# Patient Record
Sex: MALE | URBAN - METROPOLITAN AREA
[De-identification: names, ages, dates, MRNs, and addresses within clinical notes are randomized per-mention and may not be internally consistent; named-entity substitution may affect disease eponyms.]

---

## 2019-07-01 ENCOUNTER — APPOINTMENT (OUTPATIENT)
Age: 44
Setting detail: DERMATOLOGY
End: 2019-07-01

## 2019-07-01 DIAGNOSIS — D22 MELANOCYTIC NEVI: ICD-10-CM

## 2019-07-01 DIAGNOSIS — L81.4 OTHER MELANIN HYPERPIGMENTATION: ICD-10-CM

## 2019-07-01 DIAGNOSIS — L82.1 OTHER SEBORRHEIC KERATOSIS: ICD-10-CM

## 2019-07-01 PROBLEM — D22.5 MELANOCYTIC NEVI OF TRUNK: Status: ACTIVE | Noted: 2019-07-01

## 2019-07-01 PROBLEM — D48.5 NEOPLASM OF UNCERTAIN BEHAVIOR OF SKIN: Status: ACTIVE | Noted: 2019-07-01

## 2019-07-01 PROCEDURE — OTHER SUNSCREEN RECOMMENDATIONS: OTHER

## 2019-07-01 PROCEDURE — OTHER PATHOLOGY BILLING: OTHER

## 2019-07-01 PROCEDURE — 99214 OFFICE O/P EST MOD 30 MIN: CPT | Mod: 25

## 2019-07-01 PROCEDURE — 88305 TISSUE EXAM BY PATHOLOGIST: CPT

## 2019-07-01 PROCEDURE — OTHER BIOPSY BY SHAVE METHOD: OTHER

## 2019-07-01 PROCEDURE — 11102 TANGNTL BX SKIN SINGLE LES: CPT

## 2019-07-01 PROCEDURE — OTHER COUNSELING: OTHER

## 2019-07-01 ASSESSMENT — LOCATION SIMPLE DESCRIPTION DERM
LOCATION SIMPLE: LEFT LOWER BACK
LOCATION SIMPLE: RIGHT UPPER BACK

## 2019-07-01 ASSESSMENT — LOCATION DETAILED DESCRIPTION DERM
LOCATION DETAILED: LEFT INFERIOR MEDIAL LOWER BACK
LOCATION DETAILED: RIGHT SUPERIOR UPPER BACK
LOCATION DETAILED: RIGHT SUPERIOR MEDIAL UPPER BACK

## 2019-07-01 ASSESSMENT — LOCATION ZONE DERM: LOCATION ZONE: TRUNK

## 2019-07-01 NOTE — PROCEDURE: PATHOLOGY BILLING
Immunohistochemistry (41726 and 12139) billing is not performed here. Please use the Immunohistochemistry Stain Billing plan to accomplish this. Immunohistochemistry (15926 and 11416) billing is not performed here. Please use the Immunohistochemistry Stain Billing plan to accomplish this.

## 2019-07-01 NOTE — PROCEDURE: BIOPSY BY SHAVE METHOD
Notification Instructions: Patient will be notified of biopsy results. However, patient instructed to call the office if not contacted within 2 weeks.
Anesthesia Volume In Cc (Will Not Render If 0): 0.5
Biopsy Type: H and E
Biopsy Method: Dermablade
Anesthesia Type: 1% Xylocaine with epinephrine
Type Of Destruction Used: Curettage
Depth Of Biopsy: dermis
Curettage Text: The wound bed was treated with curettage after the biopsy was performed.
Electrodesiccation Text: The wound bed was treated with electrodesiccation after the biopsy was performed.
Bill 97909 For Specimen Handling/Conveyance To Laboratory?: no
Cryotherapy Text: The wound bed was treated with cryotherapy after the biopsy was performed.
Dressing: Band-Aid
Wound Care: Petrolatum
Silver Nitrate Text: The wound bed was treated with silver nitrate after the biopsy was performed.
Electrodesiccation And Curettage Text: The wound bed was treated with electrodesiccation and curettage after the biopsy was performed.
Post-Care Instructions: I reviewed with the patient in detail post-care instructions. Patient is to keep the biopsy site moist, apply aquaphor or vaseline daily and cover with band-aid until healed.
X Size Of Lesion In Cm: 0
Billing Type: Third-Party Bill
Was A Bandage Applied: Yes
Detail Level: Detailed
Consent: Written consent was obtained and risks were reviewed including but not limited to scarring, infection, bleeding, scabbing, incomplete removal, nerve damage and allergy to anesthesia.
Hemostasis: Drysol

## 2020-09-01 ENCOUNTER — APPOINTMENT (OUTPATIENT)
Dept: GENERAL RADIOLOGY | Facility: CLINIC | Age: 45
End: 2020-09-01
Attending: EMERGENCY MEDICINE
Payer: COMMERCIAL

## 2020-09-01 ENCOUNTER — HOSPITAL ENCOUNTER (EMERGENCY)
Facility: CLINIC | Age: 45
Discharge: HOME | End: 2020-09-01
Attending: EMERGENCY MEDICINE | Admitting: EMERGENCY MEDICINE
Payer: COMMERCIAL

## 2020-09-01 VITALS
DIASTOLIC BLOOD PRESSURE: 83 MMHG | WEIGHT: 248.9 LBS | HEART RATE: 66 BPM | SYSTOLIC BLOOD PRESSURE: 132 MMHG | RESPIRATION RATE: 16 BRPM | TEMPERATURE: 98.2 F | OXYGEN SATURATION: 96 %

## 2020-09-01 DIAGNOSIS — R07.9 CHEST PAIN, UNSPECIFIED TYPE: ICD-10-CM

## 2020-09-01 DIAGNOSIS — H10.33 ACUTE CONJUNCTIVITIS OF BOTH EYES, UNSPECIFIED ACUTE CONJUNCTIVITIS TYPE: ICD-10-CM

## 2020-09-01 DIAGNOSIS — R11.0 NAUSEA: ICD-10-CM

## 2020-09-01 LAB
ANION GAP SERPL CALCULATED.3IONS-SCNC: 3 MMOL/L (ref 3–14)
BASOPHILS # BLD AUTO: 0 10E9/L (ref 0–0.2)
BASOPHILS NFR BLD AUTO: 0.7 %
BUN SERPL-MCNC: 14 MG/DL (ref 7–30)
CALCIUM SERPL-MCNC: 9.2 MG/DL (ref 8.5–10.1)
CHLORIDE SERPL-SCNC: 107 MMOL/L (ref 94–109)
CO2 SERPL-SCNC: 30 MMOL/L (ref 20–32)
CREAT SERPL-MCNC: 1.29 MG/DL (ref 0.66–1.25)
DIFFERENTIAL METHOD BLD: NORMAL
EOSINOPHIL # BLD AUTO: 0.1 10E9/L (ref 0–0.7)
EOSINOPHIL NFR BLD AUTO: 1.7 %
ERYTHROCYTE [DISTWIDTH] IN BLOOD BY AUTOMATED COUNT: 13.6 % (ref 10–15)
GFR SERPL CREATININE-BSD FRML MDRD: 67 ML/MIN/{1.73_M2}
GLUCOSE SERPL-MCNC: 97 MG/DL (ref 70–99)
HCT VFR BLD AUTO: 51.3 % (ref 40–53)
HGB BLD-MCNC: 16.6 G/DL (ref 13.3–17.7)
IMM GRANULOCYTES # BLD: 0 10E9/L (ref 0–0.4)
IMM GRANULOCYTES NFR BLD: 0.7 %
INTERPRETATION ECG - MUSE: NORMAL
LYMPHOCYTES # BLD AUTO: 1.3 10E9/L (ref 0.8–5.3)
LYMPHOCYTES NFR BLD AUTO: 21.7 %
MCH RBC QN AUTO: 29.1 PG (ref 26.5–33)
MCHC RBC AUTO-ENTMCNC: 32.4 G/DL (ref 31.5–36.5)
MCV RBC AUTO: 90 FL (ref 78–100)
MONOCYTES # BLD AUTO: 0.6 10E9/L (ref 0–1.3)
MONOCYTES NFR BLD AUTO: 10.8 %
NEUTROPHILS # BLD AUTO: 3.7 10E9/L (ref 1.6–8.3)
NEUTROPHILS NFR BLD AUTO: 64.4 %
NRBC # BLD AUTO: 0 10*3/UL
NRBC BLD AUTO-RTO: 0 /100
PLATELET # BLD AUTO: 203 10E9/L (ref 150–450)
POTASSIUM SERPL-SCNC: 4.1 MMOL/L (ref 3.4–5.3)
RBC # BLD AUTO: 5.71 10E12/L (ref 4.4–5.9)
SODIUM SERPL-SCNC: 140 MMOL/L (ref 133–144)
TROPONIN I SERPL-MCNC: <0.015 UG/L (ref 0–0.04)
WBC # BLD AUTO: 5.8 10E9/L (ref 4–11)

## 2020-09-01 PROCEDURE — 25000132 ZZH RX MED GY IP 250 OP 250 PS 637: Performed by: EMERGENCY MEDICINE

## 2020-09-01 PROCEDURE — 93005 ELECTROCARDIOGRAM TRACING: CPT

## 2020-09-01 PROCEDURE — 99285 EMERGENCY DEPT VISIT HI MDM: CPT | Mod: 25

## 2020-09-01 PROCEDURE — 85025 COMPLETE CBC W/AUTO DIFF WBC: CPT | Performed by: EMERGENCY MEDICINE

## 2020-09-01 PROCEDURE — 80048 BASIC METABOLIC PNL TOTAL CA: CPT | Performed by: EMERGENCY MEDICINE

## 2020-09-01 PROCEDURE — 25000128 H RX IP 250 OP 636: Performed by: EMERGENCY MEDICINE

## 2020-09-01 PROCEDURE — 71045 X-RAY EXAM CHEST 1 VIEW: CPT

## 2020-09-01 PROCEDURE — 84484 ASSAY OF TROPONIN QUANT: CPT | Performed by: EMERGENCY MEDICINE

## 2020-09-01 PROCEDURE — 96374 THER/PROPH/DIAG INJ IV PUSH: CPT

## 2020-09-01 RX ORDER — DICYCLOMINE HCL 20 MG
20 TABLET ORAL 4 TIMES DAILY PRN
Qty: 20 TABLET | Refills: 0 | Status: SHIPPED | OUTPATIENT
Start: 2020-09-01 | End: 2022-08-20

## 2020-09-01 RX ORDER — ASPIRIN 325 MG
325 TABLET ORAL ONCE
Status: COMPLETED | OUTPATIENT
Start: 2020-09-01 | End: 2020-09-01

## 2020-09-01 RX ORDER — DICYCLOMINE HCL 20 MG
20 TABLET ORAL ONCE
Status: COMPLETED | OUTPATIENT
Start: 2020-09-01 | End: 2020-09-01

## 2020-09-01 RX ORDER — ONDANSETRON 2 MG/ML
4 INJECTION INTRAMUSCULAR; INTRAVENOUS ONCE
Status: COMPLETED | OUTPATIENT
Start: 2020-09-01 | End: 2020-09-01

## 2020-09-01 RX ORDER — SULFACETAMIDE SODIUM 100 MG/ML
1-2 SOLUTION/ DROPS OPHTHALMIC EVERY 6 HOURS
Qty: 3 ML | Refills: 0 | Status: SHIPPED | OUTPATIENT
Start: 2020-09-01 | End: 2020-09-08

## 2020-09-01 RX ADMIN — DICYCLOMINE HYDROCHLORIDE 20 MG: 20 TABLET ORAL at 10:39

## 2020-09-01 RX ADMIN — ONDANSETRON 4 MG: 2 INJECTION INTRAMUSCULAR; INTRAVENOUS at 10:22

## 2020-09-01 RX ADMIN — ASPIRIN 325 MG ORAL TABLET 325 MG: 325 PILL ORAL at 10:39

## 2020-09-01 ASSESSMENT — ENCOUNTER SYMPTOMS
EYE DISCHARGE: 1
FEVER: 0
BACK PAIN: 1
DIARRHEA: 1
EYE REDNESS: 1

## 2020-09-01 NOTE — ED AVS SNAPSHOT
[facility] Emergency Department  [address]  [facility] [address]  Phone:  [phone]  Fax:  [phone]                                    Rigo Vanegas   MRN: [id number]    Department:  [facility] Emergency Department   Date of Visit:  9/1/2020           After Visit Summary Signature Page    I have received my discharge instructions, and my questions have been answered. I have discussed any challenges I see with this plan with the nurse or doctor.    ..........................................................................................................................................  Patient/Patient Representative Signature      ..........................................................................................................................................  Patient Representative Print Name and Relationship to Patient    ..................................................               ................................................  Date                                   Time    ..........................................................................................................................................  Reviewed by Signature/Title    ...................................................              ..............................................  Date                                               Time          22EPIC Rev 08/18

## 2020-09-01 NOTE — ED TRIAGE NOTES
"Chest pain and shortness of breath for the past 2 weeks.  Associated with stomach \"churning\"  Also having red and mattery eyes for the past week.   "

## 2020-09-01 NOTE — ED PROVIDER NOTES
"  History     Chief Complaint:  Chest Pain    The history is provided by the patient.      Rigo Vanegas is a 45 year old male who presents with mid back pain and left sided chest pain. He reports that he developed a stabbing pain in his back several weeks ago which radiated to his left chest and arm, which feel dull and achy. He is also experiencing an associated \"churning\" sensation in his stomach and indigestion. 1 week ago, he developed red, mattery eyes and cloudy vision. He was seen in the  and was advised to come to the ED for further evaluation of his chest pain. He denies shortness of breath above his baseline and has no known sick contacts. Of note, his mother has a Cyclospora infection, however, his fecal culture was negative.     Allergies:  No known drug allergies     Medications:    Albuterol  Lexapro  Advair  Norco  Nasonex  Singulair  Hydroxyzine     Past Medical History:    Asthma  Depressive disorder    Past Surgical History:    History reviewed. No pertinent surgical history.    Family History:    History reviewed. No pertinent family history.     Social History:  Smoking status: never  Alcohol use: not currently   Marital Status:  Single [1]     Review of Systems   Constitutional: Negative for fever.   Eyes: Positive for discharge and redness.   Cardiovascular: Positive for chest pain.   Gastrointestinal: Positive for diarrhea.   Musculoskeletal: Positive for back pain.   All other systems reviewed and are negative.    Physical Exam     Patient Vitals for the past 24 hrs:   BP Temp Temp src Pulse Resp SpO2 Weight   09/01/20 1100 132/83 -- -- 66 -- 96 % --   09/01/20 1030 126/80 -- -- 72 -- 97 % --   09/01/20 1008 (!) 117/99 98.2  F (36.8  C) Oral 76 16 100 % 112.9 kg (248 lb 14.4 oz)     Physical Exam  Constitutional: Vital signs reviewed as above.   Head: No external signs of trauma noted.  Eyes: Pupils are equal, round, and reactive to light. B/L conjunctival injection  Neck: No JVD " "noted  Cardiovascular: Normal rate, regular rhythm and normal heart sounds.  No murmur heard. Equal B/L peripheral pulses.  Pulmonary/Chest: Effort normal and breath sounds normal. No respiratory distress. Patient has no wheezes. Patient has no rales.   Gastrointestinal: Soft. There is no tenderness on my exam. No rebound or guarding.   Musculoskeletal/Extremities: No edema noted. Normal tone.  Neurological: Patient is alert and oriented to person, place, and time.   Skin: Skin is warm and dry. There is no diaphoresis noted.   Psychiatric: The patient appears calm.    Emergency Department Course   EKG:  ECG Taken at 10:15     NSR  Normal EKG  Rate: 69. SD: 124. QRS: 92. QTc: 430.  P-R-T Axes:   27   58   50  No old EKG  Interpreted by me at 1019 on 9/1/2020    Imaging:  Radiographic findings were communicated with the patient who voiced understanding of the findings.  XR Chest Port 1 view   IMPRESSION: No acute cardiopulmonary disease  Reading per radiology    Laboratory:  Laboratory findings were communicated with the patient who voiced understanding of the findings.  CBC: WNL. (WBC 5.8, HGB 16.6, )   BMP: Glucose 97, o/w WNL (Creatinine: 1.29 (H))    Troponin (Collected 1023): <0.015    Interventions:  1022 Zofran 4mg IV injection   1039 Bentyl, 20 mg, PO  1039 Aspirin, 325 mg, PO    Emergency Department Course:    ED Course as of Sep 01 1416   Tue Sep 01, 2020   1253 Rechecked and updated via iPad. Abdominal \"churning\" is better. Chest feels fine. Comfortable with DC and PCP F/U.          Impression & Plan      Medical Decision Making:  This 45-year-old male patient presents the ED due to chest pain, abdominal discomfort, and had eye irritation.  Please see the HPI and exam for specifics.  Patient remained well in the ED.  I do not believe that his symptoms are from ACS.  His eyes look like bilateral conjunctivitis and I will treat him as such with antibiotics.  He seemed to have some improvement of his " abdominal symptoms with Bentyl so I will encourage him to follow-up in the outpatient setting with his primary care clinic and discuss further follow-up with gastroenterology due to the duration of his abdominal symptoms but in the meantime we will prescribe Bentyl and Zofran for home and encouraged follow-up as noted.  Anticipatory guidance given prior to discharge.      Diagnosis:    ICD-10-CM    1. Acute conjunctivitis of both eyes, unspecified acute conjunctivitis type  H10.33 CBC with platelets differential     Troponin I     Basic metabolic panel   2. Chest pain, unspecified type  R07.9    3. Nausea  R11.0        Disposition:  discharged to home    Discharge Medications:  Discharge Medication List as of 9/1/2020  1:03 PM      START taking these medications    Details   dicyclomine (BENTYL) 20 MG tablet Take 1 tablet (20 mg) by mouth 4 times daily as needed (abdominal cramping), Disp-20 tablet,R-0, E-Prescribe      sulfacetamide (BLEPH-10) 10 % ophthalmic solution Apply 1-2 drops to eye every 6 hours for 7 days, Disp-3 mL,R-0, E-Prescribe           Scribe Disclosure:  ITheresa, am serving as a scribe at 10:40 AM on 9/1/2020 to document services personally performed by Tariq Cox DO based on my observations and the provider's statements to me.     Theresa Shipley  9/1/2020   [facility] EMERGENCY DEPARTMENT       Tariq Cox DO  09/01/20 8502

## 2020-09-01 NOTE — DISCHARGE INSTRUCTIONS
"Diagnosis: Abdominal \"churning\", chest pain, conjunctivitis  What do you do next:   Continue your home medications unless we have specifically changed them  I have prescribed an antibiotic drop for your eyes called sulfacetamide.  Please take this as directed  I have prescribed \"Bentyl\" for your abdomen.  Please take this as directed (as needed)  It would be reasonable to discuss repeat kidney function testing with your primary clinic as well as discussing gastroenterology referral as well.  Follow up as indicated below    When do you return: If you have worsening abdominal pain; intractable vomiting; severe chest pain associated with lightheadedness, fainting, shortness of breath, cold sweats, or any other symptoms that concern you, please return to the ED for reevaluation.    Thank you for allowing us to care for you today.    "

## 2020-09-03 ENCOUNTER — DOCUMENTATION ONLY (OUTPATIENT)
Dept: OTHER | Facility: CLINIC | Age: 45
End: 2020-09-03

## 2020-11-18 ENCOUNTER — HOSPITAL ENCOUNTER (OUTPATIENT)
Dept: CARDIOLOGY | Facility: CLINIC | Age: 45
End: 2020-11-18
Attending: INTERNAL MEDICINE
Payer: COMMERCIAL

## 2020-11-18 DIAGNOSIS — R06.00 DYSPNEA: ICD-10-CM

## 2020-11-18 PROCEDURE — 93306 TTE W/DOPPLER COMPLETE: CPT | Mod: 26 | Performed by: INTERNAL MEDICINE

## 2020-11-18 PROCEDURE — 255N000002 HC RX 255 OP 636: Performed by: INTERNAL MEDICINE

## 2020-11-18 PROCEDURE — 93010 ELECTROCARDIOGRAM REPORT: CPT | Performed by: INTERNAL MEDICINE

## 2020-11-18 PROCEDURE — 93005 ELECTROCARDIOGRAM TRACING: CPT

## 2020-11-18 RX ADMIN — HUMAN ALBUMIN MICROSPHERES AND PERFLUTREN 3 ML: 10; .22 INJECTION, SOLUTION INTRAVENOUS at 15:31

## 2020-11-19 LAB — INTERPRETATION ECG - MUSE: NORMAL

## 2021-07-10 ENCOUNTER — HEALTH MAINTENANCE LETTER (OUTPATIENT)
Age: 46
End: 2021-07-10

## 2021-09-04 ENCOUNTER — HEALTH MAINTENANCE LETTER (OUTPATIENT)
Age: 46
End: 2021-09-04

## 2022-07-31 ENCOUNTER — HEALTH MAINTENANCE LETTER (OUTPATIENT)
Age: 47
End: 2022-07-31

## 2022-08-20 ENCOUNTER — TELEPHONE (OUTPATIENT)
Dept: BEHAVIORAL HEALTH | Facility: CLINIC | Age: 47
End: 2022-08-20

## 2022-08-20 ENCOUNTER — HOSPITAL ENCOUNTER (EMERGENCY)
Facility: CLINIC | Age: 47
Discharge: HOME | End: 2022-08-20
Attending: EMERGENCY MEDICINE | Admitting: EMERGENCY MEDICINE
Payer: COMMERCIAL

## 2022-08-20 VITALS
HEIGHT: 74 IN | OXYGEN SATURATION: 100 % | BODY MASS INDEX: 28.39 KG/M2 | HEART RATE: 82 BPM | RESPIRATION RATE: 16 BRPM | TEMPERATURE: 98 F | WEIGHT: 221.2 LBS | DIASTOLIC BLOOD PRESSURE: 88 MMHG | SYSTOLIC BLOOD PRESSURE: 143 MMHG

## 2022-08-20 DIAGNOSIS — F42.9 OBSESSIVE-COMPULSIVE DISORDER, UNSPECIFIED TYPE: ICD-10-CM

## 2022-08-20 DIAGNOSIS — R45.851 SUICIDAL IDEATION: ICD-10-CM

## 2022-08-20 DIAGNOSIS — F32.A DEPRESSION, UNSPECIFIED DEPRESSION TYPE: ICD-10-CM

## 2022-08-20 LAB — SARS-COV-2 RNA RESP QL NAA+PROBE: NEGATIVE

## 2022-08-20 PROCEDURE — C9803 HOPD COVID-19 SPEC COLLECT: HCPCS

## 2022-08-20 PROCEDURE — 99285 EMERGENCY DEPT VISIT HI MDM: CPT | Mod: 25

## 2022-08-20 PROCEDURE — 99285 EMERGENCY DEPT VISIT HI MDM: CPT | Mod: 25 | Performed by: NURSE PRACTITIONER

## 2022-08-20 PROCEDURE — U0005 INFEC AGEN DETEC AMPLI PROBE: HCPCS | Performed by: EMERGENCY MEDICINE

## 2022-08-20 PROCEDURE — 90791 PSYCH DIAGNOSTIC EVALUATION: CPT

## 2022-08-20 PROCEDURE — 250N000013 HC RX MED GY IP 250 OP 250 PS 637: Performed by: NURSE PRACTITIONER

## 2022-08-20 RX ORDER — MIRTAZAPINE 45 MG/1
45 TABLET, FILM COATED ORAL AT BEDTIME
Qty: 30 TABLET | Refills: 0 | Status: SHIPPED | OUTPATIENT
Start: 2022-08-20

## 2022-08-20 RX ORDER — HYDROXYZINE HYDROCHLORIDE 25 MG/1
25-50 TABLET, FILM COATED ORAL EVERY 6 HOURS PRN
COMMUNITY

## 2022-08-20 RX ORDER — VENLAFAXINE HYDROCHLORIDE 150 MG/1
CAPSULE, EXTENDED RELEASE ORAL
COMMUNITY
Start: 2022-08-02

## 2022-08-20 RX ORDER — MIRTAZAPINE 30 MG/1
TABLET, FILM COATED ORAL
COMMUNITY
Start: 2022-08-15

## 2022-08-20 RX ORDER — MONTELUKAST SODIUM 10 MG/1
1 TABLET ORAL DAILY
COMMUNITY
Start: 2022-04-08

## 2022-08-20 RX ORDER — FEXOFENADINE HCL 180 MG/1
180 TABLET ORAL DAILY
COMMUNITY

## 2022-08-20 RX ORDER — PROPRANOLOL HYDROCHLORIDE 10 MG/1
10-20 TABLET ORAL 2 TIMES DAILY
Qty: 60 TABLET | Refills: 1 | Status: SHIPPED | OUTPATIENT
Start: 2022-08-20

## 2022-08-20 RX ORDER — DUTASTERIDE 0.5 MG/1
0.5 CAPSULE, LIQUID FILLED ORAL DAILY
COMMUNITY
Start: 2021-12-31

## 2022-08-20 RX ORDER — POLYETHYLENE GLYCOL 3350 17 G
2 POWDER IN PACKET (EA) ORAL
Status: DISCONTINUED | OUTPATIENT
Start: 2022-08-20 | End: 2022-08-20 | Stop reason: HOSPADM

## 2022-08-20 RX ORDER — BUSPIRONE HYDROCHLORIDE 10 MG/1
20 TABLET ORAL
COMMUNITY
Start: 2021-09-17

## 2022-08-20 RX ORDER — BUSPIRONE HYDROCHLORIDE 10 MG/1
20 TABLET ORAL
Status: DISCONTINUED | OUTPATIENT
Start: 2022-08-20 | End: 2022-08-20 | Stop reason: HOSPADM

## 2022-08-20 RX ADMIN — BUSPIRONE HYDROCHLORIDE 20 MG: 10 TABLET ORAL at 17:17

## 2022-08-20 ASSESSMENT — ACTIVITIES OF DAILY LIVING (ADL)
ADLS_ACUITY_SCORE: 35

## 2022-08-20 NOTE — PROGRESS NOTES
Patient agreeable to discharge plan. Discharge instructions reviewed with patient including follow-up care plan. Medications reviewed. Reviewed safety plan and outpatient resources. Denies SI and HI. All belongings that were brought into the hospital have been returned to patient. Escorted off the unit at 1730 accompanied by [facility] staff. Discharged to home via uber.

## 2022-08-20 NOTE — ED NOTES
Patient continues to rest quietly on cart. Bed in lowest position with side rail up x 1. Call light in easy reach. Patient given additional ice chips and ice water. Denies additional needs. Updated on continued POC and waits.     Continue to monitor.

## 2022-08-20 NOTE — ED NOTES
Awake alert and oriented to person, place, time and situation.    Airway is patent.    Respirations are regular and unlabored.  Patient talking in full sentences.  Patient denies cough or shortness of breath.    Pulses are strong and regular with palpation.  Skin is normal color, warm and dry.   Cap refill is less than 3 seconds.  Patient denies chest pain/pressure.    Patient reports long standing history of mental health problems. States he did try to die by suicide when he was a teenager by attempting to hang himself. Patient reports current establishment with therapist. Patient endorses problems with depression and OCD. Patient reports increased problems with suicidal thoughts since effexor increased from 225mg to 300mg 2 weeks ago. Patient denies any specific plan at present. Has had thoughts about suicide by gun or ingestion.     Patient is able to contract for safety with RN.    Patient is given scrubs and instructions to change and place all belongings in a patient belonging bag. Patient verbalized understanding.     Patient changed. Security wanded patient for safety.    Video Observation initiated, patient informed.    Patient given ice water to drink.    Patient given [facility] brochure.     Patient updated on continued pOC and waits. Denies additional needs at present.     Continue to monitor.

## 2022-08-20 NOTE — CONSULTS
"Diagnostic Evaluation Consultation  Crisis Assessment    Patient was assessed: In Person  Patient location: [facility]  Was a release of information signed: Yes. Providers included on the release: [facility] referrals, Mom Ema, Step dad Lalit, [facility]      Referral Data and Chief Complaint  Rigo is a 47 year old, who uses he/him pronouns, and presents to the ED via EMS. Patient is referred to the ED by family/friends. Patient is presenting to the ED for the following concerns: OCD, depression, si/hi.      Informed Consent and Assessment Methods     Patient is his own guardian. Writer met with patient and explained the crisis assessment process, including applicable information disclosures and limits to confidentiality, assessed understanding of the process, and obtained consent to proceed with the assessment. Patient was observed to be able to participate in the assessment as evidenced by verbal understanding of the assessment process. Assessment methods included conducting a formal interview with patient, review of medical records, collaboration with medical staff, and obtaining relevant collateral information from family and community providers when available..     Over the course of this crisis assessment provided reassurance, offered validation, engaged patient in problem solving and disposition planning, worked with patient on safety and aftercare planning and provided psychoeducation. Patient's response to interventions was engaged and cooperative     Summary of Patient Situation  Pt presents to the ED via EMS after he was found locked in the bathroom chanting obsessively while crying \"I need to kill myself, how can I kill myself\".  Pt was unable to calm himself until he came to the ED.    Brief Psychosocial History  Pt currently lives at home with his mother and step father.  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  PT states that his MH sx have been impairing his ability to " "function at work.  Pt reports support from his parents.    Significant Clinical History  Pt reports a hx of anxiety, depression, and OCD.  PT reports that the dx of OCD is newer and feels that he has not rec'd specific OCD treatment historically.  PT reports that he wakes up every morning in a panic with his obsessional thinking.  He reports that his repeative thoughts include repeating to himself for hours \"I am a loser, I am fat, I need to be a better person, I can't live like this\". Pt reports that if he wakes up and doesn't have anxiety, he becomes scared and why it's not there.  PT reports his panic and attacks exacerbates his suicidal ideation.    Pt reports on going obsessional thinking about himself, how he is as a person, and this results in on going compulsions to make himself more comfortable.  PT reports this can be as simple as changing the way he walks, sits, talks in attempts to feel less internally agitated and obsessive.  Pt reports significant guilt and shame.  Pt reports that he feels like he is being judged.    PT reports intrusive increased suicidal thoughts.  PT states that he has been thinking about different plans and if there accessible, wondering if life is worth it.  PT states that he doesn't want to die but is so uncomfortable in his body with his thoughts.  PT has been thinking about plans such as overdosing, hanging , or firearm.  Pt denies any access to firearms.  PT reports a previous attempt via hanging in college.  Pt denies any self harm via cutting, but endorses skin picking behaviors.  Pt additionally reports intrusive homicidal ideation.  Pt reports that it feels random and its thoughts such as \"what If I run him over or what if I punch him\".  Pt denies any intent.  PT denies any previous actions to hurt others and feels shameful about these thoughts.  Pt presents as flat.    Pt endorses significant depression including hours long crying spells with panic, hopelessness and " worthlessness.  Pt reports feeling internally irritable and ambivalent about life.  Pt reports that he used to be able to idenfy things that he enjoyed doing and he can no longer do as such or finds karla in such.  Pt denies any psychosis, jean claude, delusional thinking or paranoia.    PT reports previous MH hospitalization at [facility].  Pt reports an active therapist and psychiatrist at [facility].  PT reports medication compliance.  PT reports that his medications con't to be increased without relief in fact possible decompensation.    Pt denies any drug use or medical concerns.     Collateral Information   The following information was received from Ema/Lalit whose relationship to the patient is mother. Information was obtained via phone. Their phone number is  and they last had contact with patient on today.    What happened today: They report that they found him in the bathroom this am talking about wanting to kill self how he would find a way to kill himself.  They called EMS for a MH eval    What is different about patient's functioning: They report that he is not doing well.  He wakes up crying, sobbing, and holding his head.  He will state over and over for hours that he doesn't want to be a loser .  They report that his panic and crying spells can go on for so long he can get nose bleeds and he doesn't eat.  She reports that he is med and provider compliant without success of relief of sx.  They report previous MH hospitalizations.    Concern about alcohol/drug use: No    What do you think the patient needs: hospitalization    Has patient made comments about wanting to kill themselves/others:  Yes He has been making statements of suicide and wanting to die.  They report a previous suicide attempt in college via hanging himself.     If d/c is recommended, can they take part in safety/aftercare planning: Yes .    Other information:      Risk Assessment  ESS-6  1.a. Over the past 2 weeks, have you had  thoughts of killing yourself? Yes  1.b. Have you ever attempted to kill yourself and, if yes, when did this last happen? Yes college, hanging   2. Recent or current suicide plan? Yes OD, guns, hanging   3. Recent or current intent to act on ideation? No  4. Lifetime psychiatric hospitalization? Yes  5. Pattern of excessive substance use? No  6. Current irritability, agitation, or aggression? Yes  Scoring note: BOTH 1a and 1b must be yes for it to score 1 point, if both are not yes it is zero. All others are 1 point per number. If all questions 1a/1b - 6 are no, risk is negligible. If one of 1a/1b is yes, then risk is mild. If either question 2 or 3, but not both, is yes, then risk is automatically moderate regardless of total score. If both 2 and 3 are yes, risk is automatically high regardless of total score.      Score: 4, high risk      Does the patient have access to lethal means? No     Does the patient engage in non-suicidal self-injurious behavior (NSSI/SIB)? no     Does the patient have thoughts of harming others? Yes.  Does the patient have a specific victim in mind? No Do they have a plan? No Do they have intent? No Is this a duty to warn situation?  no     Is the patient engaging in sexually inappropriate behavior?  no        Current Substance Abuse     Is there recent substance abuse? no     Was a urine drug screen or blood alcohol level obtained: No       Mental Status Exam     Affect: Blunted and Flat   Appearance: Appropriate    Attention Span/Concentration: Attentive  Eye Contact: Variable   Fund of Knowledge: Appropriate    Language /Speech Content: Fluent   Language /Speech Volume: Soft    Language /Speech Rate/Productions: Articulate    Recent Memory: Intact   Remote Memory: Intact   Mood: Anxious and Depressed    Orientation to Person: Yes    Orientation to Place: Yes   Orientation to Time of Day: Yes    Orientation to Date: Yes    Situation (Do they understand why they are here?): Yes     Psychomotor Behavior: Normal    Thought Content: Suicidal   Thought Form: Intact      History of commitment:  No           Medication    Psychotropic medications: Yes. Pt is currently taking Effexor, Remeron, Visteril, Buspar. Medication compliant: Yes. Recent medication changes: No  Medication changes made in the last two weeks: No       Current Care Team    Primary Care Provider: No  Psychiatrist: [facility]  Therapist: [facility]  : No     [facility]SS or [facility]: No  [facility] Team: No  Other: No      Diagnosis    300.3 (F42) Obsessive Compulsive Disorder   300.02 (F41.1) Generalized Anxiety Disorder - primary   296.33 (F33.2) Major Depressive Disorder, Recurrent Episode, Severe _ and With anxious distress - primary       Clinical Summary and Substantiation of Recommendations    Writer at time of assessment recommends inpatient stabilization for Pt's significant level of OCD/anxiety sx comorbid with depression that has led to increased intrusive suicidal ideation with planning.  PT additionally is reporting intrusive homicidal ideation.  PT is in need of further evaluation and stabilization.  PT is voluntary.  Pt is likely in need of significant follow up care such as [facility] behavioral [facility].    Disposition    Recommended disposition: Inpatient Mental Health       Reviewed case and recommendations with attending provider. Attending Name: Victoria Martell       Attending concurs with disposition: Yes       Patient concurs with disposition: Yes       Guardian concurs with disposition: NA      Final disposition: Inpatient mental health .     Inpatient Details (if applicable):  Is patient admitted voluntarily:Yes      Patient aware of potential for transfer if there is not appropriate placement? Yes       Patient is willing to travel outside of the [facility] for placement? No      Behavioral Intake Notified? Yes: Date: 8/20 Time: 1200.     Assessment Details    Patient interview started at: 1200 and completed at: 1300.     Total  duration spent on the patient case in minutes: 1.0 hrs      CPT code(s) utilized: 98712 - Psychotherapy for Crisis - 60 (30-74*) min       PETR Saavedra  [facility] - Triage & Transition Services

## 2022-08-20 NOTE — ED PROVIDER NOTES
"History   Chief Complaint:  \"It's been a really tough year or two\"    HPI   History supplemented by electronic chart review    Rigo Vanegas is a 47 year old male who presents by EMS for evaluation of mental health concerns including some suicidal thoughts with notions of using a gun or overdosing on pills, though he volunteers that he does not have access to a gun and does not think his pills would cause death.  He reports \"lots of guilt, shame, fear, embarrassment\" related to \"not being good enough\" and cites previous diagnosis of OCD for which he is undergoing therapy.  He reports medication compliance without overdose and states that his Lexapro was increased to 300 mg about 3 weeks ago by his outpatient team.  He denies any fevers, cough, new pain, vomiting, or any other medical concerns.  He does not use alcohol or drugs.    Review of Systems  All other systems reviewed and negative except as above in HPI.    Allergies:  No Known Allergies     Medications:    albuterol (PROAIR HFA, PROVENTIL HFA, VENTOLIN HFA) 108 (90 BASE) MCG/ACT inhaler  BREO ELLIPTA 200-25 MCG/INH Inhaler  busPIRone (BUSPAR) 10 MG tablet  dutasteride (AVODART) 0.5 MG capsule  fexofenadine (ALLEGRA) 180 MG tablet  hydrOXYzine (ATARAX) 25 MG tablet  mirtazapine (REMERON) 30 MG tablet  montelukast (SINGULAIR) 10 MG tablet  tiZANidine (ZANAFLEX) 4 MG tablet  venlafaxine (EFFEXOR XR) 150 MG 24 hr capsule        Past Medical History:    Past Medical History:   Diagnosis Date     Asthma      Depressive disorder        There are no problems to display for this patient.       Past Surgical History:    History reviewed. No pertinent surgical history.     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  Lives with parent and parent in law, with whom he reports a good relationship.    Physical Exam     Patient Vitals for the past 24 hrs:   BP Temp Temp src Pulse Resp SpO2 Height Weight   08/20/22 1649 (!) 143/88 98  F " "(36.7  C) Oral 82 16 100 % -- --   08/20/22 1047 133/89 97.7  F (36.5  C) Oral 79 -- 100 % 1.88 m (6' 2\") 100.3 kg (221 lb 3.2 oz)   08/20/22 0843 (!) 128/90 99.6  F (37.6  C) Temporal 89 20 94 % 1.88 m (6' 2\") 102.1 kg (225 lb)      Physical Exam  General: Nontoxic-appearing male sitting upright in room 16  HENT: mucous membranes moist, beard  Eyes: PERRL without nystagmus  CV: extremities well perfused, regular rhythm  Resp: normal effort, speaks in full phrases, no stridor, no cough observed  GI: abdomen soft and nontender, no guarding  MSK: no bony tenderness   Skin: appropriately warm and dry  Neuro: alert, clear speech, oriented, normal tone in extremities, ambulatory  Psych: cooperative, reports feeling suicidal, no evidence of hallucinations    Emergency Department Course   Laboratory:  Labs Ordered and Resulted from Time of ED Arrival to Time of ED Departure   COVID-19 VIRUS (CORONAVIRUS) BY PCR - Normal       Result Value    SARS CoV2 PCR Negative     COMPREHENSIVE METABOLIC PANEL   ACETAMINOPHEN LEVEL   SALICYLATE LEVEL        Emergency Department Course:  Reviewed:  I reviewed nursing notes, vitals, and past medical history    Assessments:  I obtained history and examined the patient as noted above.          Patient placed on a DOMINICK    Consults:   See above in ED Course    Interventions:  Medications   busPIRone (BUSPAR) tablet 20 mg (has no administration in time range)        Disposition:  Transfer to [facility]     Impression & Plan    Medical Decision Making:  He presents with worrisome deterioration of his care is a diagnosed psychiatric illness despite reported medication compliance.  Medical precipitants were considered such as infection, metabolic abnormality, intoxication or withdrawal states, though are not identified or highly suspected.  The rationale for transfer the EmPATH unit for more detailed psychiatric evaluation and treatment was discussed and he was subsequently transferred there once " medically cleared.    Diagnosis:    ICD-10-CM    1. Suicidal ideation  R45.851    2. Obsessive-compulsive disorder, unspecified type  F42.9    3. Depression, unspecified depression type  F32.A           8/20/2022   MD Shanice Davenport, Chuy Ward MD  08/20/22 1708

## 2022-08-20 NOTE — SAFE
Rigo Vanegas  August 20, 2022  SAFE Note    Critical Safety Issues: intrusive si/hi related to OCD/anxiety      Current Suicidal Ideation/Self-Injurious Concerns/Methods: Asphyxiation and Picking      Current or Historical Inappropriate Sexual Behavior: No      Current or Historical Aggression/Homicidal Ideation: None - N/A      Triggers: anxiety/ocd    Guardianship Status: [facility] Guardian: is his own guardian.. Guardianship paperwork is in [employer] ACP tab.     This patient is a child/adolescent: No    This patient has additional special visitor precautions: No    Updated care team: Yes: MD RN    For additional details see full [facility] assessment.       PETR Saavedra

## 2022-08-20 NOTE — ED NOTES
Bed: ED16  Expected date:   Expected time:   Means of arrival:   Comments:  Chris 593 47 M suicidal

## 2022-08-20 NOTE — DISCHARGE INSTRUCTIONS
Please following medication changes given to you today on [facility]    Follow up with your established Psychiatric Prescriber as you state you will see this provider this coming week    Please follow up with your established therapist this week at [facility]    Contact [facility] for the Intensive Outpatient Programs for OCD.  You can self-refer yourself. The contact information:         Call Santiago  For general questions or to request a free screening, call [phone] for inpatient, residential, or outpatient care in [facility] or [phone] for all other locations.    5.   Return to the Emergency Department if things become worse.     ____________________________________________________________________________________________________________________________________________________________________________________  Aftercare Plan  If I am feeling unsafe or I am in a crisis, I will:   Contact my established care providers   Call the National Suicide Prevention Lifeline: 988  Go to the nearest emergency room   Call 911     Warning signs that I or other people might notice when a crisis is developing for me: 1. When I start to get quiet  2. When I withdrawl    Things I am able to do on my own to cope or help me feel better:   1. Exercise  2. Hobbies-Video Games, Netflix     Things that I am able to do with others to cope or help me better:   1. Exercise  2. Hobbies-Video Games, Netflix     Things I can use or do for distraction:   1. Exercise  2. Hobbies-Video Games, Netflix     Changes I can make to support my mental health and wellness:   1. Look into self referring to [facility] Intensive Outpatient Programming for OCD  2. Continue taking medications as prescribed     People in my life that I can ask for help:   1. My mother  2. My sister     Your [facility] has a mental health crisis team you can call 24/7: [facility] Crisis  [phone]    Other  "things that are important when I'm in crisis:      Additional resources and information:         Crisis Lines  Crisis Text Line  Text 336341  You will be connected with a trained live crisis counselor to provide support.    Muna thomas, maio  JUVENCIO a 065008 o texto a 442-AYUDAME en WhatsApp    The Michael Project (LGBTQ Youth Crisis Line)  7.322.527.7774  text START to 664-792      [employer]  Fast Tracker  Linking people to mental health and substance use disorder resources  Going     [place] Mental Health Warm Line  Peer to peer support  Monday thru Saturday, 12 pm to 10 pm  407.523.2162 or 7.336.112.5941  Text \"Support\" to 35293    National [place] on Mental Illness (NITIN)  089.402.6169 or 1.888.NITIN.HELPS      Mental Health Apps  My3  https://Macromillpp.org/    VirtualHopeBox  https://Kwarter/apps/virtual-hope-box/      Additional Information  Today you were seen by a licensed mental health professional through Triage and Transition services, Behavioral Healthcare Providers ([facility])  for a crisis assessment in the Emergency Department at [facility].  It is recommended that you follow up with your established providers (psychiatrist, mental health therapist, and/or primary care doctor - as relevant) as soon as possible. Coordinators from [facility] will be calling you in the next 24-48 hours to ensure that you have the resources you need.  You can also contact [facility] coordinators directly at [phone]. You may have been scheduled for or offered an appointment with a mental health provider. [facility] maintains an extensive network of licensed behavioral health providers to connect patients with the services they need.  We do not charge providers a fee to participate in our referral network.  We match patients with providers based on a patient's specific needs, insurance coverage, and location.  Our first effort will be to refer you to a provider within your care system, and will " utilize providers outside your care system as needed.

## 2022-08-20 NOTE — ED NOTES
[facility] Reassessment and Progress Note    Client Name: Rigo Vanegas  Date: August 20, 2022       Presenting issue that brought patient to the emPATH unit: The patient was seen by my colleague Yi [facility] for OCD and intrusive SI thoughts that are ego-dystonic in nature.  The initial recommendation was for patient to be admitted to psychiatry.  The patient has now seen the Deven PRAJAPATI and this provider is recommending for patient to discharge and follow up with established providers and provide information on IOP for OCD..    Current presentation on the unit: The patient denies any SI/HI presently.  He identifies protective factors including his mother and sister.   He identifies future forward thinking as he wants to get back to the gym this weekend as he feels stronger and likes working out.  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   He is looking forward to medication adjustments recommended by Deven PRAJAPATI.   He is scheduled to see his OP prescriber in the next few days and also will see his therapist again next Saturday.  In order to deal with the intrusive chronic SI thoughts that he wakes up to, he uses his distractions to get through of video games, netflix, or ignoring the thoughts.   He expresses feeling better since being here on [facility] and feels better with new plan and recommendation.     Current risk to self or others? No    Summary of therapeutic interventions completed with patient: Supportive therapy, seen medication provider    Treatment objectives addressed in this session: Assessment of current level of OCD and intrusive SI thoughts that are ego-dystonic.     Progress on treatment goals: The patient is devoid of any SI/HI.       Additional collateral information:      Mental Status:     Appearance:   Appropriate    Eye Contact:   Good    Psychomotor Behavior: Normal    Attitude:   Cooperative    Orientation:   All   Speech    Rate /  Production: Normal/ Responsive    Volume:  Normal    Mood:    Anxious    Affect:    Appropriate    Thought Content:  Clear    Thought Form:  Coherent    Insight:    Fair       Plan: 1. Discharge home today.  2. Given info on [facility] OCD programming 3. Patient to continue with established providers 4. Follow medication adjustments made here on Deven.     Disposition: Individual therapy , Medication management and Programmatic care: Given information to self-enroll in [facility]    Rationale for disposition: Patient devoid of SI/HI presently.  Plan for patient to seek additional help with [facility] and current established providers.  Patient expressing feeling better after meeting with Deven PRAJAPATI for evaluation.     Reviewed assessment with attending provider: VICTORINA Martell     Diagnosis: OCD    Total time spent with patient:.50 hrs     CPT code: 17857 - Psychotherapy (with patient) - 30 (16-37*) min      CHRISTOPHE Collado     Aftercare Plan  If I am feeling unsafe or I am in a crisis, I will:   Contact my established care providers   Call the National Suicide Prevention Lifeline: 988  Go to the nearest emergency room   Call 911     Warning signs that I or other people might notice when a crisis is developing for me: 1. When I start to get quiet  2. When I withdrawl    Things I am able to do on my own to cope or help me feel better:   1. Exercise  2. Hobbies-Video Games, Netflix     Things that I am able to do with others to cope or help me better:   1. Exercise  2. Hobbies-Video Games, Netflix     Things I can use or do for distraction:   1. Exercise  2. Hobbies-Video Games, Netflix     Changes I can make to support my mental health and wellness:   1. Look into self referring to [facility] Intensive Outpatient Programming for OCD  2. Continue taking medications as prescribed     People in my life that I can ask for help:   1. My mother  2. My sister     Your [facility] has a mental health  "crisis team you can call 24/7: [facility] Crisis  [phone]    Other things that are important when I'm in crisis:      Additional resources and information:         Crisis Lines  Crisis Text Line  Text 364753  You will be connected with a trained live crisis counselor to provide support.    Por william, texto  JUVENCIO a 924504 o texto a 442-AYUDAME en WhatsApp    The Michael Project (LGBTQ Youth Crisis Line)  6.877.749.0698  text START to 918-756      [employer]  Fast Tracker  Linking people to mental health and substance use disorder resources  WhoSay.Yones     [place] Mental Health Warm Line  Peer to peer support  Monday thru Saturday, 12 pm to 10 pm  794.832.1100 or 6.692.527.9650  Text \"Support\" to 64199    National [place] on Mental Illness (NITIN)  030.578.3004 or 1.888.NITIN.HELPS      Mental Health Apps  My3  https://EmergentDetection.org/    VirtualHopeBox  https://DealBirdorg/apps/virtual-hope-box/      Additional Information  Today you were seen by a licensed mental health professional through Triage and Transition services, Behavioral Healthcare Providers ([facility])  for a crisis assessment in the Emergency Department at [facility].  It is recommended that you follow up with your established providers (psychiatrist, mental health therapist, and/or primary care doctor - as relevant) as soon as possible. Coordinators from [facility] will be calling you in the next 24-48 hours to ensure that you have the resources you need.  You can also contact [facility] coordinators directly at [phone]. You may have been scheduled for or offered an appointment with a mental health provider. [facility] maintains an extensive network of licensed behavioral health providers to connect patients with the services they need.  We do not charge providers a fee to participate in our referral network.  We match patients with providers based on a patient's specific needs, insurance coverage, and location.  Our first " effort will be to refer you to a provider within your care system, and will utilize providers outside your care system as needed.

## 2022-08-20 NOTE — TELEPHONE ENCOUNTER
S: 12:00p Yi w/ DEC called Intake w/ clinical on a 47/M present in the emPATH Unit w/ SI/HI.     B:  The pt is currently at the emPATH Unit presenting w/ SI/HI. SI w/ several plans- pt endorses using a gun, overdose via Rx medications, and hanging. There is no concern for aggression. Pt denies AH/VH. SIB in for of the pt picking their skin. The pt is currently picking their skins- no wounds or excessive picking. HI- general/intrusive thoughts w/ OCD. No intent- pt is fearful they might harm someone.     The pt denies using any substances.     The pt has been IP for MH before- [facility] 2021 Unknown Dates.     The pts MH Hx is as follows: OCD, Anxiety and Depression.     The pt is Rx MH medications. Medications are listed in Epic. The pt is complaint per pt and family reported.     The pt does have a medication management provider, through [facility] unknown provider.     The pts does have a therapist, through [facility] unknown provider.     Per  the pt can ambulate independently.     Per  the pt is indep with ADLs.    The pt does not have any known medical concerns.     Covid resulted as negative.   Utox needs to be ordered.    A: Vol - metro only for placement.     R: The pt is currently in the emPATH Unit awaiting bed placement.    12:11p The pt has been added to the work-list.     Intake Afternoon Bed Search (Done at 12:14p)  [facility] is posting 0 beds.   [facility] is posting 0 beds.  [facility] is posting 0 beds.  [facility] is posting 0 beds.  [facility] is posting 0 beds.  [facility] is posting 0 beds[facility]  [facility] is posting 0 beds.  [facility] is posting 1 beds. Low acuity. Intake received notification at 9:18a-[facility] is at capacity to 4p. Intake to call back after 4p for up to date bed availability.     12:17p [facility] at capacity. The pt remains on the waitlist. Intake continues to identify appropriate bed placement.
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
## 2022-08-21 NOTE — ED PROVIDER NOTES
[facility] - Psychiatric Consultation  [facility] Emergency Department    Rigo Vanegas MRN: [id number]   Age: 47 year old YOB: 1975     History     Chief Complaint   Patient presents with     Suicidal     HPI  Rigo Vanegas is a 47 year old male with history notable for chronic depression and anxiety, most likely OCD, who presents to the ED by EMS at the request of his mother, for concerns for suicidal ideation.  Patient was evaluated by the ED provider, who medically cleared patient to transfer to [facility] for psychiatric assessment, this is reviewed along with all pertinent labs and tests performed.    On examination, patient is calm and cooperative as he describes his history of anxiety disorder which started at the age of 6 years old. He recalls being raised in a [employer] home which believed sins occur not only in actions, but also one's thoughts, which patient recalls causing him much distress at this age as he found it challenging as a child to control his thoughts where he found himself continuously worring he was engaging in sinful behavior, ultimately negatively impacting his self-esteem and self-worth. He describes persistent feelings of guilt, shame, inadequacy, self-doubt and delmy fear of judgment related to this ideology.  He describes as he got older, he felt more paralyzed by social anxiety symptoms of ruminating on how other's perceive him and questioning his moral character as he felt he was failing by having difficulty with thought control. He states he never felt at peace or able to relax and enjoy life or activities due to constant worry and the need to self-regulate by continuously acknowledging the obsessive thoughts as a means to reassure himself of the persistent negative dialogue occuring in his head.  In college, patient notes he used alcohol as a way to self-medicate as it worked temporarily in social situations although he recalls it also caused him to  engage in over-the-top behaviors, noting he had a low tolerance for alcohol and it did not take a large amount for him to loose his inhibition. Once while intoxicated, patient states he attempted suicide via hanging resulting in him starting therapy and medications. After college, patient stopped consuming alcohol and avoided all substances. He was started on Lexapro around this time and felt his anxiety symptoms were in check during this time in his life.  He notes his OCD was not fully appreciated until after his symptoms exacerbated while being on the medication at which point he has engaged in individual therapy as well as psychopharmacology to alleviate the symptoms.     Patient states he currently is engaged in exposure-response therapy where the goal is to not react to the obsessive thoughts through compulsions rather to recognize the emotion and tolerate the discomfort which accompanies this.  Patient feels this has been a helpful approach as he is noticing longer time frames between episodes of ruminations while also experiencing more emotional discomfort and feeling overwhelmed in the moments he is applying the skill.  He is prescribed Effexor  mg daily, which was recently increased from 150 mg daily three weeks ago, mirtazapine 30 mg nightly, and buspirone 20 mg TID. Patient feels since the increase in Effexor, he does feel he is achieving longer durations of symptom relief, yet finds the mornings to be the most difficult time for him.  He states he wakes up in a physical state of panic. He has over awareness of his body senses, he feels tremulous, SOB and doomed which causes his obsessional thoughts to heighten to maximum intensity.  He states this has been occurring over the past three mornings and his mother has been witnessing these episodes and called EMS this morning as he made suicidal comments of shooting himself or taking pills as a means to end the emotional suffering in the moment.  "Patient clarifies during on conversation the thoughts were present yet the urge to act on them were not. He notes he does not have a gun nor feels his medications would be fatal. He does feel he is making progress in his current treatment plan while showing insight into his condition as he has learned more about it recognizing the benefit of additional outpatient supports and medication recommendations.    Past Medical History  Past Medical History:   Diagnosis Date     Asthma      Depressive disorder      History reviewed. No pertinent surgical history.  albuterol (PROAIR HFA, PROVENTIL HFA, VENTOLIN HFA) 108 (90 BASE) MCG/ACT inhaler  BREO ELLIPTA 200-25 MCG/INH Inhaler  busPIRone (BUSPAR) 10 MG tablet  dutasteride (AVODART) 0.5 MG capsule  fexofenadine (ALLEGRA) 180 MG tablet  hydrOXYzine (ATARAX) 25 MG tablet  mirtazapine (REMERON) 30 MG tablet  mirtazapine (REMERON) 45 MG tablet  montelukast (SINGULAIR) 10 MG tablet  propranolol (INDERAL) 10 MG tablet  tiZANidine (ZANAFLEX) 4 MG tablet  venlafaxine (EFFEXOR XR) 150 MG 24 hr capsule      No Known Allergies  Family History  History reviewed. No pertinent family history.  Social History   Social History     Tobacco Use     Smoking status: Never Smoker     Smokeless tobacco: Never Used   Substance Use Topics     Alcohol use: Not Currently     Drug use: Not Currently      Past medical history, past surgical history, medications, allergies, family history, and social history were reviewed with the patient. No additional pertinent items.       Review of Systems  A complete review of systems was performed with pertinent positives and negatives noted in the HPI, and all other systems negative.    Physical Examination   BP: (!) 128/90  Pulse: 89  Temp: 99.6  F (37.6  C)  Resp: 20  Height: 188 cm (6' 2\")  Weight: 102.1 kg (225 lb)  SpO2: 94 %    Physical Exam  General: Appears stated age.   Neuro: Alert and fully oriented. Extremities appear to demonstrate normal strength " on visual inspection.   Integumentary/Skin: no rash visualized, normal color    Psychiatric Examination   Appearance: awake, alert  Attitude:  cooperative  Eye Contact:  good  Mood:  anxious and better  Affect:  intensity is blunted  Speech:  clear, coherent  Psychomotor Behavior:  no evidence of tardive dyskinesia, dystonia, or tics  Thought Process:  logical, linear and goal oriented  Associations:  no loose associations  Thought Content:  no evidence of suicidal ideation or homicidal ideation, no evidence of psychotic thought and obsessions present  Insight:  good  Judgement:  intact  Oriented to:  time, person, and place  Attention Span and Concentration:  intact  Recent and Remote Memory:  intact  Language: able to name/identify objects without impairment  Fund of Knowledge: intact with awareness of current and past events    ED Course        Labs Ordered and Resulted from Time of ED Arrival to Time of ED Departure   COVID-19 VIRUS (CORONAVIRUS) BY PCR - Normal       Result Value    SARS CoV2 PCR Negative         Assessments & Plan (with Medical Decision Making)   Patient presenting with suicidal ideation in the context of heightened symptoms related to OCD further complicated by engaging in intensive exposure response therapy which is most likely intensifying his emotional distress as he applies the skills to decrease his thought compulsions. We discussed the nature of these symptoms as expected given where he is at in his wellness journey. We discussed interventions to support him during this time, including inpatient treatment. Patient considered this earlier in the day, yet has found he has been able to control the distress in this environment and feels much improved then when he came in. He does not endorse suicidal thoughts and requests discharge home. We discussed medication options and additional outpatient supports.    Nursing notes reviewed noting no acute issues.     I have reviewed the assessment  completed by the [facility].     After a period of working with the treatment team on the [facility] unit, the patient's mental state improved to allow a safe transition to outpatient care. After counseling on the diagnosis, work-up, and treatment plan, the patient was discharged. Close follow-up with a psychiatrist and/or therapist was recommended and community psychiatric resources were provided. Patient is to return to the ED if any urgent or potentially life-threatening concerns.     At the time of discharge, the patient's acute suicide risk was determined to be low due to the following factors: Reduction in the intensity of mood/anxiety symptoms that preceded the admission, denial of suicidal thoughts, denies feeling helpless or helpless, not currently under the influence of alcohol or illicit substances, denies experiencing command hallucinations, no immediate access to firearms. The patient's acute risk could be higher if noncompliant with their treatment plan, medications, follow-up appointments or using illicit substances or alcohol. Protective factors include: social supports, stable housing, employment.    Preliminary diagnosis:    ICD-10-CM    1. Suicidal ideation  R45.851    2. Obsessive-compulsive disorder, unspecified type  F42.9    3. Depression, unspecified depression type  F32.A         Treatment Plan:  -Discharge home per patient request. He does not exhibit signs or symptoms of imminent harm to himself or others to meet criteria for an involuntary hold.  -Referral for [facility] IOP.  -Continue with established therapist.  -Follow up with established psychiatric provider for medication management.  -Discussed risk-benefit of changing Effexor at this time due to risk of posing more harm then good. Dose was increased three weeks ago with patient noting some benefit, thus will continue current dose and look at augmenting the physiological symptoms of his anxiety with propranolol 10-20 mg BID. Instructed him  to start with 10 mg to jessica his tolerance. If he does not notice a reduction in physical symptoms, to increase dose to 20 mg. Encouraged taking at bedtime and first thing in the morning to mitigate waking up in a panic.  -Increased Remeron from 30 mg to 45 mg nightly to target anxiety symptoms and allow more symptom management overnight to bridge the gap until his morning dose of Effexor, which may be wearing off by morning explaining why mornings may be more challenging for him.  -Continue Buspar 20 mg TID. Will order afternoon dose on the unit so patient does not miss a dose.  -Could also consider a trial of clomipramine in the future to treat OCD symptoms.  -Medication education provided this visit includes, rationale for medication, importance of compliance, medication interactions, and common side effects. Patient agreeable.  -Problem focused, supportive therapy provided around patients stressors and symptoms related to their diagnosis.  Validated patients emotions and problems solved with patient around stress management and self care strategies. Patient was receptive.   --  VICTORINA Mcginnis CNP   [facility] EMERGENCY DEPT  [facility] Unit  8/20/2022      Victoria Martell APRN CNP  08/21/22 1105

## 2022-09-20 ENCOUNTER — HOSPITAL ENCOUNTER (EMERGENCY)
Facility: CLINIC | Age: 47
Discharge: HOME | End: 2022-09-21
Attending: FAMILY MEDICINE | Admitting: FAMILY MEDICINE
Payer: COMMERCIAL

## 2022-09-20 DIAGNOSIS — F42.9 OBSESSIVE-COMPULSIVE DISORDER, UNSPECIFIED TYPE: ICD-10-CM

## 2022-09-20 DIAGNOSIS — R45.851 SUICIDAL IDEATION: ICD-10-CM

## 2022-09-20 DIAGNOSIS — F41.0 PANIC ATTACK: ICD-10-CM

## 2022-09-20 PROCEDURE — 99285 EMERGENCY DEPT VISIT HI MDM: CPT | Mod: 25 | Performed by: FAMILY MEDICINE

## 2022-09-20 PROCEDURE — 99285 EMERGENCY DEPT VISIT HI MDM: CPT | Performed by: FAMILY MEDICINE

## 2022-09-20 ASSESSMENT — ACTIVITIES OF DAILY LIVING (ADL)
ADLS_ACUITY_SCORE: 33
ADLS_ACUITY_SCORE: 35

## 2022-09-21 VITALS
HEART RATE: 74 BPM | OXYGEN SATURATION: 98 % | WEIGHT: 229.1 LBS | TEMPERATURE: 97.6 F | SYSTOLIC BLOOD PRESSURE: 159 MMHG | RESPIRATION RATE: 18 BRPM | BODY MASS INDEX: 29.41 KG/M2 | DIASTOLIC BLOOD PRESSURE: 97 MMHG

## 2022-09-21 PROCEDURE — 90791 PSYCH DIAGNOSTIC EVALUATION: CPT

## 2022-09-21 PROCEDURE — 250N000013 HC RX MED GY IP 250 OP 250 PS 637: Performed by: FAMILY MEDICINE

## 2022-09-21 RX ORDER — BUSPIRONE HYDROCHLORIDE 10 MG/1
20 TABLET ORAL
Status: DISCONTINUED | OUTPATIENT
Start: 2022-09-21 | End: 2022-09-21 | Stop reason: HOSPADM

## 2022-09-21 RX ORDER — MIRTAZAPINE 45 MG/1
45 TABLET, FILM COATED ORAL AT BEDTIME
Status: DISCONTINUED | OUTPATIENT
Start: 2022-09-21 | End: 2022-09-21 | Stop reason: HOSPADM

## 2022-09-21 RX ORDER — PROPRANOLOL HYDROCHLORIDE 10 MG/1
10-20 TABLET ORAL 2 TIMES DAILY
Status: DISCONTINUED | OUTPATIENT
Start: 2022-09-21 | End: 2022-09-21 | Stop reason: HOSPADM

## 2022-09-21 RX ORDER — VENLAFAXINE HYDROCHLORIDE 150 MG/1
300 CAPSULE, EXTENDED RELEASE ORAL DAILY
Status: DISCONTINUED | OUTPATIENT
Start: 2022-09-21 | End: 2022-09-21 | Stop reason: HOSPADM

## 2022-09-21 RX ORDER — DUTASTERIDE 0.5 MG/1
0.5 CAPSULE, LIQUID FILLED ORAL DAILY
Status: DISCONTINUED | OUTPATIENT
Start: 2022-09-21 | End: 2022-09-21 | Stop reason: HOSPADM

## 2022-09-21 RX ORDER — ALBUTEROL SULFATE 90 UG/1
2 AEROSOL, METERED RESPIRATORY (INHALATION) EVERY 6 HOURS
Status: DISCONTINUED | OUTPATIENT
Start: 2022-09-21 | End: 2022-09-21 | Stop reason: HOSPADM

## 2022-09-21 RX ORDER — HYDROXYZINE HYDROCHLORIDE 25 MG/1
25-50 TABLET, FILM COATED ORAL EVERY 6 HOURS PRN
Status: DISCONTINUED | OUTPATIENT
Start: 2022-09-21 | End: 2022-09-21 | Stop reason: HOSPADM

## 2022-09-21 RX ORDER — MONTELUKAST SODIUM 10 MG/1
10 TABLET ORAL DAILY
Status: DISCONTINUED | OUTPATIENT
Start: 2022-09-21 | End: 2022-09-21 | Stop reason: HOSPADM

## 2022-09-21 RX ADMIN — BUSPIRONE HYDROCHLORIDE 20 MG: 10 TABLET ORAL at 00:47

## 2022-09-21 RX ADMIN — BUSPIRONE HYDROCHLORIDE 20 MG: 10 TABLET ORAL at 09:54

## 2022-09-21 RX ADMIN — MIRTAZAPINE 45 MG: 45 TABLET, FILM COATED ORAL at 00:47

## 2022-09-21 RX ADMIN — MONTELUKAST 10 MG: 10 TABLET, FILM COATED ORAL at 09:53

## 2022-09-21 RX ADMIN — FLUTICASONE FUROATE AND VILANTEROL TRIFENATATE 1 PUFF: 200; 25 POWDER RESPIRATORY (INHALATION) at 09:53

## 2022-09-21 RX ADMIN — VENLAFAXINE HYDROCHLORIDE 300 MG: 150 CAPSULE, EXTENDED RELEASE ORAL at 09:54

## 2022-09-21 RX ADMIN — DUTASTERIDE 0.5 MG: 0.5 CAPSULE, LIQUID FILLED ORAL at 09:55

## 2022-09-21 ASSESSMENT — ACTIVITIES OF DAILY LIVING (ADL)
ADLS_ACUITY_SCORE: 35

## 2022-09-21 NOTE — ED PROVIDER NOTES
Emergency Department Patient Sign-out       Brief HPI:  This is a 47 year old male signed out to me by Dr. Mcpherson .  See initial ED Provider note for details of the presentation.          Patient is medically cleared for admission to a Behavioral Health unit.      The patient is not on a hold.      The patient has not required medication for agitation.    Awaiting Behavioral Health Assessment (DEC)        Significant Events prior to my assuming care: 47-year-old male with a history of OCD who is presenting with increasing episodes of anxiety and panic as well as some reported intrusive suicidal thoughts.  Was seen 2 shifts ago and placed in the queue for behavioral assessment which is pending at the time of signout and will be reviewed with me.      Exam:   Patient Vitals for the past 24 hrs:   BP Temp Temp src Pulse Resp SpO2 Weight   09/20/22 2345 128/78 98.2  F (36.8  C) Oral 96 18 98 % --   09/20/22 1948 125/81 98.5  F (36.9  C) Oral 106 16 96 % 103.9 kg (229 lb 1.6 oz)           ED RESULTS:   No results found for this visit on 09/20/22 (from the past 24 hour(s)).    ED MEDICATIONS:   Medications   albuterol (PROVENTIL HFA/VENTOLIN HFA) inhaler (2 puffs Inhalation Not Given 9/21/22 0121)   fluticasone-vilanterol (BREO ELLIPTA) 200-25 MCG/INH inhaler 1 puff (has no administration in time range)   busPIRone (BUSPAR) tablet 20 mg (20 mg Oral Given 9/21/22 0047)   dutasteride (AVODART) capsule 0.5 mg (has no administration in time range)   hydrOXYzine (ATARAX) tablet 25-50 mg (has no administration in time range)   mirtazapine (REMERON) tablet 45 mg (45 mg Oral Given 9/21/22 0047)   montelukast (SINGULAIR) tablet 10 mg (has no administration in time range)   propranolol (INDERAL) tablet 10-20 mg (has no administration in time range)   tiZANidine (ZANAFLEX) tablet 4 mg (4 mg Oral Not Given 9/21/22 0122)   venlafaxine (EFFEXOR XR) 24 hr capsule 300 mg (has no administration in time range)         Impression:     ICD-10-CM    1. Obsessive-compulsive disorder, unspecified type  F42.9    2. Panic attack  F41.0    3. Suicidal ideation  R45.851        Plan:    Pending studies include none.  Patient signed out to Dr. Petty.      MD Venus Cerda David, MD  09/21/22 5886

## 2022-09-21 NOTE — DISCHARGE INSTRUCTIONS
Discharge to home with plans to start on Abilify increase therapy return if any increased risk to self.      Aftercare Plan  If I am feeling unsafe or I am in a crisis, I will:   Contact my established care providers   Call the National Suicide Prevention Lifeline: 988  Go to the nearest emergency room   Call 911     Warning signs that I or other people might notice when a crisis is developing for me:   Building up inside me  Increased stress  Not getting enough sleep    Things I am able to do on my own to cope or help me feel better:   Sit down meditate  Point out things in environment that I see  Push pressure point in between thumb and pointer finger  Holding ice in hands over a sink  When starting to have a negative self talk, say out loud or write down 3 positives for every negative.     Things that I am able to do with others to cope or help me better:   Go for a walk  Talk about how you are feeling    Things I can use or do for distraction:   Sit down meditate  Point out things in environment that I see  Push pressure point in between thumb and pointer finger  Holding ice in hands over a sink  When starting to have a negative self talk, say out loud or write down 3 positives for every negative.   Play a video game  Watch TV  Listen to music    Changes I can make to support my mental health and wellness:   Lifting weights  Exercise  Walking  Continue to call and get updates on where you are at regarding admission to residential program in [place].  Increase OP therapy while you wait for placement  Take medications as prescribed.    People in my life that I can ask for help:   Mom- Ema  Step dad- Lalit  Sister- Alanna    Your [facility] has a mental health crisis team you can call 24/7: [facility] Crisis  [phone]    Other things that are important when I'm in crisis:   Remember to use crisis hotline if needing to.     Crisis Lines  Crisis Text Line  Text 362420  You will be connected with a trained live  "crisis counselor to provide support.    Por prudenciomark, texto  JUVENCIO a 541540 o texto a 442-AYUDAME en WhatsApp    [employer] (LGBTQ Youth Crisis Line)  5.177.214.2643  text START to 792-657    Community Resources  Fast Tracker  Linking people to mental health and substance use disorder resources  [employer] (LGTmedical)     [place] [facility] Warm Line  Peer to peer support  Monday thru Saturday, 12 pm to 10 pm  469.529.6521 or 9.807.177.6029  Text \"Support\" to 31837    National [place] on Mental Illness (NITIN)  065.827.2906 or 1.888.NITIN.HELPS    Mental Health Apps  My3  https://[employer].org/    VirtualHopeBox  https://[employer]/apps/virtual-hope-box/    Additional Information  Today you were seen by a licensed mental health professional through Triage and Transition services, Behavioral Healthcare Providers ([facility])  for a crisis assessment in the Emergency Department at [facility].  It is recommended that you follow up with your established providers (psychiatrist, mental health therapist, and/or primary care doctor - as relevant) as soon as possible. Coordinators from [facility] will be calling you in the next 24-48 hours to ensure that you have the resources you need.  You can also contact [facility] coordinators directly at [phone]. You may have been scheduled for or offered an appointment with a mental health provider. [facility] maintains an extensive network of licensed behavioral health providers to connect patients with the services they need.  We do not charge providers a fee to participate in our referral network.  We match patients with providers based on a patient's specific needs, insurance coverage, and location.  Our first effort will be to refer you to a provider within your care system, and will utilize providers outside your care system as needed.      "

## 2022-09-21 NOTE — ED PROVIDER NOTES
ED Provider Note  [facility]      History     Chief Complaint   Patient presents with     Suicidal     Depressed and cannot stop crying. SI but no plans.     HPI  Rigo Vanegas is a 47 year old male who presents emergency room with concerns of increasing suicidal ideation.  Patient states that he has underlying OCD and has been working on this with a therapist on a weekly basis however he does feel at this time that he is having episodes where he feels overwhelmed he has increased anxiety and panic attacks and when he is having the sensations has been having more intrusive suicidal thoughts.  Patient did not want to discuss any specific plans but is unclear as to whether or not he can maintain safety at this time.  Patient will need to be seen and evaluated by the  we have discussed possible outpatient options versus possible admission.  Patient does not feel safe at this time and will remain in the emergency room until  can see him once again patient understands the importance of safety and if is still having intrusive suicidal thoughts will likely require inpatient admission.  Patient denies any other significant past medical history denies any other acute medical concerns.    Past Medical History  Past Medical History:   Diagnosis Date     Asthma      Depressive disorder      History reviewed. No pertinent surgical history.  busPIRone (BUSPAR) 10 MG tablet  fexofenadine (ALLEGRA) 180 MG tablet  hydrOXYzine (ATARAX) 25 MG tablet  mirtazapine (REMERON) 45 MG tablet  montelukast (SINGULAIR) 10 MG tablet  venlafaxine (EFFEXOR XR) 150 MG 24 hr capsule  albuterol (PROAIR HFA, PROVENTIL HFA, VENTOLIN HFA) 108 (90 BASE) MCG/ACT inhaler  BREO ELLIPTA 200-25 MCG/INH Inhaler  dutasteride (AVODART) 0.5 MG capsule  mirtazapine (REMERON) 30 MG tablet  propranolol (INDERAL) 10 MG tablet  tiZANidine (ZANAFLEX) 4 MG tablet      No Known Allergies  Family History  History reviewed.  No pertinent family history.  Social History   Social History     Tobacco Use     Smoking status: Never Smoker     Smokeless tobacco: Never Used   Substance Use Topics     Alcohol use: Not Currently     Drug use: Not Currently      Past medical history, past surgical history, medications, allergies, family history, and social history were reviewed with the patient. No additional pertinent items.       Review of Systems  A complete review of systems was performed with pertinent positives and negatives noted in the HPI, and all other systems negative.    Physical Exam   BP: 125/81  Pulse: 106  Temp: 98.5  F (36.9  C)  Resp: 16  Weight: 103.9 kg (229 lb 1.6 oz)  SpO2: 96 %  Physical Exam  Constitutional:       General: He is not in acute distress.     Appearance: He is not diaphoretic.   HENT:      Head: Atraumatic.   Eyes:      General: No scleral icterus.     Pupils: Pupils are equal, round, and reactive to light.   Cardiovascular:      Heart sounds: Normal heart sounds.   Pulmonary:      Effort: No respiratory distress.      Breath sounds: Normal breath sounds.   Abdominal:      General: Bowel sounds are normal.      Palpations: Abdomen is soft.      Tenderness: There is no abdominal tenderness.   Musculoskeletal:         General: No tenderness.   Skin:     General: Skin is warm.      Findings: No rash.   Neurological:      General: No focal deficit present.      Mental Status: He is oriented to person, place, and time.      Sensory: No sensory deficit.      Motor: No weakness.      Coordination: Coordination normal.   Psychiatric:         Mood and Affect: Mood is anxious and depressed. Affect is flat.         Speech: Speech normal.         Behavior: Behavior is cooperative.         Thought Content: Thought content includes suicidal ideation.         ED Course      Procedures    The medical record was reviewed and interpreted.  Current labs reviewed and interpreted.  Patient will be seen by the  please refer  to their documentation and note section of the epic chart dated 9/20/2022    Suicide assessment completed by mental health (D.E.C., LCSW, etc.)    Medications - No data to display     Assessments & Plan (with Medical Decision Making)       I have reviewed the nursing notes. I have reviewed the findings, diagnosis, plan and need for follow up with the patient.        Final diagnoses:   Obsessive-compulsive disorder, unspecified type   Panic attack   Suicidal ideation       --  Aydin Quintero  [facility] EMERGENCY DEPARTMENT  9/20/2022     Aydin Quintero MD  09/22/22 1605

## 2022-09-21 NOTE — CONSULTS
"Diagnostic Evaluation Consultation  Crisis Assessment    Patient was assessed: Jose  Patient location: [facility] ED  Was a release of information signed: No. Reason: Pt needing to sign      Referral Data and Chief Complaint  Pt is a 47 year old, who uses he/him pronouns, and presents to the ED alone. Patient is referred to the ED by self.     Per ED Note: \"Patient transferred from ED to [facility]. Currently denies suicidal ideation but states he presented to ED earlier with increasing depression with suicidal ideation without a specific plan. Denies homicidal ideation and/or hallucination. Guarded but engages on transfer interview/assessment. Verbally contracts for safety. Safety precautions in place. Awaiting for HS med prior to bed. Staff to continue to monitor. Carlos Solo RN 9/21/2022  6:15 AM\"    Per initial Provider Note: \"LAURYN-Rigo Vanegas is a 47 year old male who presents emergency room with concerns of increasing suicidal ideation.  Patient states that he has underlying OCD and has been working on this with a therapist on a weekly basis however he does feel at this time that he is having episodes where he feels overwhelmed he has increased anxiety and panic attacks and when he is having the sensations has been having more intrusive suicidal thoughts.  Patient did not want to discuss any specific plans but is unclear as to whether or not he can maintain safety at this time.  Patient will need to be seen and evaluated by the  we have discussed possible outpatient options versus possible admission.  Patient does not feel safe at this time and will remain in the emergency room until  can see him once again patient understands the importance of safety and if is still having intrusive suicidal thoughts will likely require inpatient admission.  Patient denies any other significant past medical history denies any other acute medical concerns. Aydin Quintero MD 9/20/2022 " "11:09 PM\"    Informed Consent and Assessment Methods  Patient is his own guardian. Writer met with patient and explained the crisis assessment process, including applicable information disclosures and limits to confidentiality, assessed understanding of the process, and obtained consent to proceed with the assessment. Patient was observed to be able to participate in the assessment as evidenced by appearing alert and oriented. Assessment methods included conducting a formal interview with patient, review of medical records, collaboration with medical staff, and obtaining relevant collateral information from family and community providers when available.    Over the course of this crisis assessment provided reassurance, offered validation, engaged patient in problem solving and disposition planning, worked with patient on safety and aftercare planning and provided psychoeducation. Patient's response to interventions was calm and cooperative, willing to try new skills to reduce current symptoms causing him distress.     Summary of Patient Situation  Pt states he is scared, overall. Pt states his body is uneasy and he is experiencing a lot of what if\"s regarding his thoughts. Pt states he is embarrassed and ashamed for having a problem and not having it fixed. Pt states he feels he is not good enough.   Pt states he had a nervous breakdown 2 years ago and that this has been a long road to getting him better. Pt states he was experiencing a panic attack 24/7 in the past. Pt states he has stopped doing some of his repetitive behaviors which causes him to feel uncomfortable. He is working on this in therapy and via medication management.   Pt was accepted and is currently on the wait list for [facility] in [place] to specifically address his OCD, Anxiety and Depression in a residential format. Pt reports the wait list is about 2-3 months out at this time. He is not sure where he is at and was encouraged to call " "them and stay in touch with the program in regards to securing an admission date. Pt agreed to this plan.  In the mean time while he waits, he plans to keep taking his medications as prescribed and attending individual therapy. Pt endorses suicidal ideations and has had them chronically for years. Pt denied any active or current plan or intent to harm himself at this time. Pt denied any HI or SIB. Pt denied any substance use. Pt reports feeling safe at home and feels he can keep himself safe. Pt developed a safety plan and states he feels he is able to use said plan. Pt was encouraged to return to ED or call 911 or crisis at any time, should he feel he can't follow this plan or his symptoms worsen.     Brief Psychosocial History  Per DEC assessment from 08/20/2022: \"Pt currently lives at home with his mother and step father.  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  PT states that his MH sx have been impairing his ability to function at work.  Pt reports support from his parents.\"    Pt confirmed the above information today for this [facility]. Pt denied any financial concerns at this time. Pt denied any previous  experience. Pt denied any legal issues.  Hobbies: Lifting weights, playing video games, movie, TV, hiking.  Support: Mom, step dad, sister, friends, co workers.  Strengths: Patient, kind, is calm.    Significant Clinical History  Per DEC assessment on 08/20/2022: \"Pt reports a hx of anxiety, depression, and OCD.  PT reports that the dx of OCD is newer and feels that he has not rec'd specific OCD treatment historically.  PT reports that he wakes up every morning in a panic with his obsessional thinking.  He reports that his repeative thoughts include repeating to himself for hours \"I am a loser, I am fat, I need to be a better person, I can't live like this\". Pt reports that if he wakes up and doesn't have anxiety, he becomes scared and why it's not there.  PT reports his " "panic and attacks exacerbates his suicidal ideation.  Pt reports on going obsessional thinking about himself, how he is as a person, and this results in on going compulsions to make himself more comfortable.  PT reports this can be as simple as changing the way he walks, sits, talks in attempts to feel less internally agitated and obsessive.  Pt reports significant guilt and shame.  Pt reports that he feels like he is being judged.  PT reports intrusive increased suicidal thoughts.  PT states that he has been thinking about different plans and if there accessible, wondering if life is worth it.  PT states that he doesn't want to die but is so uncomfortable in his body with his thoughts.  PT has been thinking about plans such as overdosing, hanging , or firearm.  Pt denies any access to firearms.  PT reports a previous attempt via hanging in college.  Pt denies any self harm via cutting, but endorses skin picking behaviors.  Pt additionally reports intrusive homicidal ideation.  Pt reports that it feels random and its thoughts such as \"what If I run him over or what if I punch him\".  Pt denies any intent.  PT denies any previous actions to hurt others and feels shameful about these thoughts.  Pt presents as flat.  Pt endorses significant depression including hours long crying spells with panic, hopelessness and worthlessness.  Pt reports feeling internally irritable and ambivalent about life.  Pt reports that he used to be able to idenfy things that he enjoyed doing and he can no longer do as such or finds karla in such.  Pt denies any psychosis, jean claude, delusional thinking or paranoia.  PT reports previous  hospitalization at [facility].  Pt reports an active therapist and psychiatrist at [facility].  PT reports medication compliance.  PT reports that his medications con't to be increased without relief in fact possible decompensation.  Pt denies any drug use or medical concerns.\"    Today pt reports continued " "intrusive thoughts, negative thinking, depression and suicidal thoughts. Pt denied having a plan or intent to harm himself. Pt denied any HI or SIB. Pt discussed with this LMHP how he plans to call his therapist and see if he can increase the frequency of his session for added support while he waits to start residential programming. Pt also plans to start his Abilify prescription which he states is to help with his intrusive thoughts and depression.      Collateral Information  The following information was received from Ema whose relationship to the patient is mother. Information was obtained via phone. Their phone number is [phone] and they last had contact with patient on 09/21/2022.    What happened today: Has crying and screaming fits, a had very bad one last night. Pt seems to be afraid of something and is not living up to something he should be. Pt feels he should be doing something more with his life. In the past he has made statements about wanting to die, which was about 5 weeks ago. At that time his mother called the ambulance and brought him to the ED. Has anxiety, depression and OCD. She is not sure what happened to cause the panic.     What is different about patient's functioning: Seems to be getting worse, critical of himself. Has been taking a lot of medications.     Concern about alcohol/drug use: No    What do you think the patient needs: To go to the program where he can stay, which he is on a wait list for.     Has patient made comments about wanting to kill themselves/others:  Yes in the past. Said last night to his mom: \"I want to die\".    If d/c is recommended, can they take part in safety/aftercare planning: Yes will help     Risk Assessment  ESS-6  1.a. Over the past 2 weeks, have you had thoughts of killing yourself? Yes, Pt states his thoughts are daily \"everyday all day\". Triggers are when \"he feels others are judging him and what others feel about him. Being embarrassed and " "ashamed. Where he lives, the clothes he wears, his whole life\".  1.b. Have you ever attempted to kill yourself and, if yes, when did this last happen? Yes In his 20's pt tried to hang himself.    2. Recent or current suicide plan? No   3. Recent or current intent to act on ideation? No  4. Lifetime psychiatric hospitalization? Yes  5. Pattern of excessive substance use? No  6. Current irritability, agitation, or aggression? Yes- irritable and agitation  Scoring note: BOTH 1a and 1b must be yes for it to score 1 point, if both are not yes it is zero. All others are 1 point per number. If all questions 1a/1b - 6 are no, risk is negligible. If one of 1a/1b is yes, then risk is mild. If either question 2 or 3, but not both, is yes, then risk is automatically moderate regardless of total score. If both 2 and 3 are yes, risk is automatically high regardless of total score.      Score: 3, moderate risk      Does the patient have access to lethal means? No  Does the patient engage in non-suicidal self-injurious behavior (NSSI/SIB)? no  Does the patient have thoughts of harming others? No  Is the patient engaging in sexually inappropriate behavior?  no     Current Substance Abuse  Is there recent substance abuse? no  Was a urine drug screen or blood alcohol level obtained: No     Mental Status Exam   Affect: Other: Appeared calm at times and anxious at times.- appeared to self regulate during interview   Appearance: Appropriate    Attention Span/Concentration: Attentive  Eye Contact: Engaged   Fund of Knowledge: Appropriate    Language /Speech Content: Fluent   Language /Speech Volume: Normal    Language /Speech Rate/Productions: Normal    Recent Memory: Intact   Remote Memory: Intact   Mood: Anxious    Orientation to Person: Yes    Orientation to Place: Yes   Orientation to Time of Day: Yes    Orientation to Date: Yes    Situation (Do they understand why they are here?): Yes    Psychomotor Behavior: Normal    Thought " Content: Other: Chrpnic suicidal ideations   Thought Form: Goal Directed and Intact      History of commitment: No    Medication  Psychotropic medications:   Current Facility-Administered Medications   Medication     albuterol (PROVENTIL HFA/VENTOLIN HFA) inhaler     busPIRone (BUSPAR) tablet 20 mg     dutasteride (AVODART) capsule 0.5 mg     fluticasone-vilanterol (BREO ELLIPTA) 200-25 MCG/INH inhaler 1 puff     hydrOXYzine (ATARAX) tablet 25-50 mg     mirtazapine (REMERON) tablet 45 mg     montelukast (SINGULAIR) tablet 10 mg     propranolol (INDERAL) tablet 10-20 mg     tiZANidine (ZANAFLEX) tablet 4 mg     venlafaxine (EFFEXOR XR) 24 hr capsule 300 mg     Current Outpatient Medications   Medication     busPIRone (BUSPAR) 10 MG tablet     fexofenadine (ALLEGRA) 180 MG tablet     hydrOXYzine (ATARAX) 25 MG tablet     mirtazapine (REMERON) 45 MG tablet     montelukast (SINGULAIR) 10 MG tablet     venlafaxine (EFFEXOR XR) 150 MG 24 hr capsule     albuterol (PROAIR HFA, PROVENTIL HFA, VENTOLIN HFA) 108 (90 BASE) MCG/ACT inhaler     BREO ELLIPTA 200-25 MCG/INH Inhaler     dutasteride (AVODART) 0.5 MG capsule     mirtazapine (REMERON) 30 MG tablet     propranolol (INDERAL) 10 MG tablet     tiZANidine (ZANAFLEX) 4 MG tablet     Medication changes made in the last two weeks: Was going to start taking Abilify, which is at the pharmacy and ready to . Pt hopes this medication will help with the intrusive thoughts and his depression. He hopes to start taking this today.    Current Care Team  Primary Care Provider: Chris Amor. Last appt a few weeks ago. Perceived as helpful: yes.    Psychiatrist: Yes. Name: Linsey Hong. Location: [facility] in [place]. Date of last visit: Monday. Frequency: 1 time per month. Perceived helpfulness: yes.    Therapist: Yes. Name: Elisa. Location: [facility]. Date of last visit: one week ago. Frequency: e/o week. Perceived helpfulness: yes. Has  an appointment next week and will discussed increasing sessions to weekly for additional support until he is able to start his residential program.     : No  CTSS or ARMHS: No  ACT Team: No  Other: No    Diagnosis  300.3 (F42) Obsessive Compulsive Disorder   300.02 (F41.1) Generalized Anxiety Disorder - primary   296.33 (F33.2) Major Depressive Disorder, Recurrent Episode, Severe _ and With anxious distress - primary     Clinical Summary and Substantiation of Recommendations    After therapeutic assessment, intervention and aftercare planning by ED care team and LM and in consultation with attending provider, the patient's circumstances and mental state were appropriate for outpatient management. It is the recommendation of this clinician that pt discharge with OP MH support. A this time the pt is not presenting as an acute risk to self or others due to the following factors: Pt states he feels he can be safe discharging home to his mom and step father's home with a safety plan he developed during the assessment. Pt denied any plans or intent to harm himself at this time. Pt was calm and cooperative for the assessment, identifying personal strengths and protective factors.     Disposition  Recommended disposition: Individual Therapy, Medication Management and Other: On wait list for residential program in [place]       Reviewed case and recommendations with attending provider. Attending Name: Dr Donovan    Attending concurs with disposition: Yes    Patient concurs with disposition: Yes       Final disposition: Individual Therapy, Medication Management and Other: On wait list for residential program in [place]      Outpatient Details (if applicable):   Aftercare plan and appointments placed in the AVS and provided to patient: Yes. Given to patient by ED Staff    Was lethal means counseling provided as a part of aftercare planning? No;     Assessment Details  Patient interview started at: 8:42AM and completed at:  9:19AM.  Total duration spent on the patient case in minutes: 1.75 hrs   CPT code(s) utilized: 30244 - Psychotherapy for Crisis - 60 (30-74*) min     Ema Bruno, [facility], [facility], [facility]  DEC - Triage & Transition Services  Callback: [phone]    Aftercare Plan  If I am feeling unsafe or I am in a crisis, I will:   Contact my established care providers   Call the National Suicide Prevention Lifeline: 988  Go to the nearest emergency room   Call 911     Warning signs that I or other people might notice when a crisis is developing for me:   Building up inside me  Increased stress  Not getting enough sleep    Things I am able to do on my own to cope or help me feel better:   Sit down meditate  Point out things in environment that I see  Push pressure point in between thumb and pointer finger  Holding ice in hands over a sink  When starting to have a negative self talk, say out loud or write down 3 positives for every negative.     Things that I am able to do with others to cope or help me better:   Go for a walk  Talk about how you are feeling    Things I can use or do for distraction:   Sit down meditate  Point out things in environment that I see  Push pressure point in between thumb and pointer finger  Holding ice in hands over a sink  When starting to have a negative self talk, say out loud or write down 3 positives for every negative.   Play a video game  Watch TV  Listen to music    Changes I can make to support my mental health and wellness:   Lifting weights  Exercise  Walking  Continue to call and get updates on where you are at regarding admission to residential program in [place].  Increase OP therapy while you wait for placement  Take medications as prescribed.    People in my life that I can ask for help:   Mom- Ema  Step dad- Lalit  Sister- Alanna    Your [facility] has a mental health crisis team you can call 24/7: [facility]  [phone]    Other things that are important when I'm in crisis:  "  Remember to use crisis hotline if needing to.     Crisis Lines  Crisis Text Line  Text 214372  You will be connected with a trained live crisis counselor to provide support.    Por william, maio  JUVENCIO a [id number] o texto a 442-AYUDAME en Whatdari    The Michael Project (LGBTQ Youth Crisis Line)  7.899.931.9176  text START to 698-568    [facility]  Fast Tracker  Linking people to mental health and substance use disorder resources  [employer]iv[employer]ik[employer]     [place] [facility] Warm Line  Peer to peer support  Monday thru Saturday, 12 pm to 10 pm  709.256.3857 or 7.955.465.4113  Text \"Support\" to 75501    National [place] on Mental Illness (NITIN)  173.142.7461 or 1.888.NITIN.HELPS    Mental Health Apps  My3  https://[employer] Gr[employer].org/    VirtualHopeBox  https://TOMODO/apps/virtual-hope-box/    Additional Information  Today you were seen by a licensed mental health professional through Triage and Transition services, [facility] ([facility])  for a crisis assessment in the Emergency Department at [facility].  It is recommended that you follow up with your established providers (psychiatrist, mental health therapist, and/or primary care doctor - as relevant) as soon as possible. Coordinators from [facility] will be calling you in the next 24-48 hours to ensure that you have the resources you need.  You can also contact [facility] coordinators directly at [phone]. You may have been scheduled for or offered an appointment with a mental health provider. [facility] maintains an extensive network of licensed behavioral health providers to connect patients with the services they need.  We do not charge providers a fee to participate in our referral network.  We match patients with providers based on a patient's specific needs, insurance coverage, and location.  Our first effort will be to refer you to a provider within your care system, and will utilize providers outside your care system as needed.  "

## 2022-09-21 NOTE — ED NOTES
Patient transferred from ED to [facility]. Currently denies suicidal ideation but states he presented to ED earlier with increasing depression with suicidal ideation without a specific plan. Denies homicidal ideation and/or hallucination. Guarded but engages on transfer interview/assessment. Verbally contracts for safety. Safety precautions in place. Awaiting for HS med prior to bed. Staff to continue to monitor.

## 2022-10-16 ENCOUNTER — HEALTH MAINTENANCE LETTER (OUTPATIENT)
Age: 47
End: 2022-10-16

## 2023-08-26 ENCOUNTER — HEALTH MAINTENANCE LETTER (OUTPATIENT)
Age: 48
End: 2023-08-26

## 2024-10-19 ENCOUNTER — HEALTH MAINTENANCE LETTER (OUTPATIENT)
Age: 49
End: 2024-10-19